# Patient Record
(demographics unavailable — no encounter records)

---

## 2024-10-31 NOTE — ASSESSMENT
[FreeTextEntry1] : 53-year-old female who presents as a follow-up -CT scan from emergency department reviewed - 3mm left UVJ stone. No longer w/ pain.  -Stone panel w/ CMP, uric acid, PTH -24 hour urine -TTM 6 weeks to discuss results

## 2024-10-31 NOTE — HISTORY OF PRESENT ILLNESS
[FreeTextEntry1] : 53-year-old female who presents as a follow-up  To review, she was seen as a new patient April 2024 with UTI symptoms, left lower quadrant pain UA at that time with nitrite, leukocyte esterase positivity - started on abx She was recommended a CT renal stone protocol which was not performed  She was subsequently seen in the emergency department 7/29/2024 with abdominal pain. CT with 4mm LEFT proximal ureteral stone w/ hydronephrosis Also with colonic thickening Subsequently passed her stone on f/u CT 8/2024 She had a lower pole stone in the kidney at that time, which she subsequently passed this past weekend.  CT scan demonstrating left UVJ 3 mm stone  Currently feels well.  No fever, chills, nausea, emesis.

## 2024-12-12 NOTE — HISTORY OF PRESENT ILLNESS
[FreeTextEntry1] : This telephonic visit was provided via audio only technology. The patient, was located at [facility] at the time of the visit.  The provider, Slim Henley, was located at [place], [place] at the time of the visit. The patient and Provider participated in the telephonic visit.  Verbal consent for telephonic services was given on 12/12/2024 by the patient.   The patient-doctor relationship has been established in a face to face fashion via real time video/audio HIPAA compliant communication using telemedicine software. The patient's identity has been confirmed. The patient was previously emailed a copy of the telemedicine consent. They have had a chance to review and has now given verbal consent and has requested care to be assessed and treated via telemedicine. They understand there may be limitations in this process, and that they may need further followup care in the office and/or hospital settings.   53-year-old female who presents as a follow-up  To review, she was seen as a new patient April 2024 with UTI symptoms, left lower quadrant pain UA at that time with nitrite, leukocyte esterase positivity - started on abx She was recommended a CT renal stone protocol which was not performed  She was subsequently seen in the emergency department 7/29/2024 with abdominal pain. CT with 4mm LEFT proximal ureteral stone w/ hydronephrosis Also with colonic thickening Subsequently passed her stone on f/u CT 8/2024. Passed another stone 10/2024  24 hour urine reviewed - only notable for low urine volume at 1.18 L / day. No flank pain

## 2024-12-12 NOTE — HISTORY OF PRESENT ILLNESS
[FreeTextEntry1] : This telephonic visit was provided via audio only technology. The patient, was located at [facility] at the time of the visit.  The provider, Slim Henley, was located at [place], [place] at the time of the visit. The patient and Provider participated in the telephonic visit.  Verbal consent for telephonic services was given on 12/12/2024 by the patient.   The patient-doctor relationship has been established in a face to face fashion via real time video/audio HIPAA compliant communication using telemedicine software. The patient's identity has been confirmed. The patient was previously emailed a copy of the telemedicine consent. They have had a chance to review and has now given verbal consent and has requested care to be assessed and treated via telemedicine. They understand there may be limitations in this process, and that they may need further followup care in the office and/or hospital settings.   53-year-old female who presents as a follow-up  To review, she was seen as a new patient April 2024 with UTI symptoms, left lower quadrant pain UA at that time with nitrite, leukocyte esterase positivity - started on abx She was recommended a CT renal stone protocol which was not performed  She was subsequently seen in the emergency department 7/29/2024 with abdominal pain. CT with 4mm LEFT proximal ureteral stone w/ hydronephrosis Also with colonic thickening Subsequently passed her stone on f/u CT 8/2024. Passed another stone 10/2024  24 hour urine reviewed - only notable for low urine volume at 1.18 L / day. No flank pain

## 2024-12-12 NOTE — HISTORY OF PRESENT ILLNESS
[FreeTextEntry1] : This telephonic visit was provided via audio only technology. The patient, was located at [facility] at the time of the visit.  The provider, Slim Henley, was located at [place], [place] at the time of the visit. The patient and Provider participated in the telephonic visit.  Verbal consent for telephonic services was given on 12/12/2024 by the patient.   The patient-doctor relationship has been established in a face to face fashion via real time video/audio HIPAA compliant communication using telemedicine software. The patient's identity has been confirmed. The patient was previously emailed a copy of the telemedicine consent. They have had a chance to review and has now given verbal consent and has requested care to be assessed and treated via telemedicine. They understand there may be limitations in this process, and that they may need further followup care in the office and/or hospital settings.   53-year-old female who presents as a follow-up  To review, she was seen as a new patient April 2024 with UTI symptoms, left lower quadrant pain UA at that time with nitrite, leukocyte esterase positivity - started on abx She was recommended a CT renal stone protocol which was not performed  She was subsequently seen in the emergency department 7/29/2024 with abdominal pain. CT with 4mm LEFT proximal ureteral stone w/ hydronephrosis Also with colonic thickening Subsequently passed her stone on f/u CT 8/2024. Passed another stone 10/2024  24 hour urine reviewed - only notable for low urine volume at 1.18 L / day. No flank pain

## 2024-12-12 NOTE — ASSESSMENT
[FreeTextEntry1] : 53-year-old female who presents as a follow-up for stones  - CT scan from emergency department reviewed - 3mm left UVJ stone. 2mm LEFT intra-parenchymal stone (no stone apparent in collecting system). Also with possible 1mm RIGHT LP stone - Stone panel wnl - 24 hour urine reviewed w/ patient - only notable for low urine volume, mildly elevated urine sodium. Discussed increasing hydration to 84 oz / day, lowering salt  RPA Feb for ULS as scheduled  Telehealth Consultation: 15 minutes - 7 minutes reviewing his history and discussing prior results. 8 minutes discussing various treatment options and writing his note.

## 2025-02-05 NOTE — HISTORY OF PRESENT ILLNESS
[FreeTextEntry1] : 53-year-old female who presents as a follow-up  To review, she was seen as a new patient April 2024 with UTI symptoms, left lower quadrant pain UA at that time with nitrite, leukocyte esterase positivity - started on abx She was recommended a CT renal stone protocol which was not performed  She was subsequently seen in the emergency department 7/29/2024 with abdominal pain. CT with 4mm LEFT proximal ureteral stone w/ hydronephrosis Also with colonic thickening Subsequently passed her stone on f/u CT 8/2024. Passed another stone 10/2024  24 hour urine reviewed - only notable for low urine volume at 1.18 L / day.  Currently, denies flank pain.  She feels well.  No dysuria, gross hematuria

## 2025-02-05 NOTE — PHYSICAL EXAM
[Normal Appearance] : normal appearance [Well Groomed] : well groomed [Edema] : no peripheral edema [] : no respiratory distress [Bowel Sounds] : normal bowel sounds [Abdomen Tenderness] : non-tender [Costovertebral Angle Tenderness] : no ~M costovertebral angle tenderness

## 2025-02-05 NOTE — ASSESSMENT
[FreeTextEntry1] : 53-year-old female who presents as a follow-up for stones  - CT scan from emergency department reviewed - 3mm left UVJ stone. 2mm LEFT intra-parenchymal stone (no stone apparent in collecting system). Also with possible 1mm RIGHT LP stone - ULS today demonstrating LEFT mid / upper calcification (corresponds to parenchymal stone seen on prior CT) and RIGHT lower pole echogenic focus. Discussed ultrasound can have false positives, and my suspicion is that the right sided lesion in question is not a stone.  In either event, the patient is asymptomatic and would elect for observation.  She has passed stones in the past without issue and has not required surgery - Previously underwent 24 hour urine - on dietary changes  f/u 6 months for ULS

## 2025-02-05 NOTE — HISTORY OF PRESENT ILLNESS
[FreeTextEntry1] : 53-year-old female who presents as a follow-up  To review, she was seen as a new patient April 2024 with UTI symptoms, left lower quadrant pain UA at that time with nitrite, leukocyte esterase positivity - started on abx She was recommended a CT renal stone protocol which was not performed  She was subsequently seen in the emergency department 7/29/2024 with abdominal pain. CT with 4mm LEFT proximal ureteral stone w/ hydronephrosis Also with colonic thickening Subsequently passed her stone on f/u CT 8/2024. Passed another stone 10/2024  24 hour urine reviewed - only notable for low urine volume at 1.18 L / day.  Currently, denies flank pain.  She feels well.  No dysuria, gross hematuria  normal/clear to auscultation bilaterally/no wheezes/no rales/no rhonchi